# Patient Record
Sex: FEMALE | Race: WHITE | NOT HISPANIC OR LATINO | Employment: OTHER | URBAN - METROPOLITAN AREA
[De-identification: names, ages, dates, MRNs, and addresses within clinical notes are randomized per-mention and may not be internally consistent; named-entity substitution may affect disease eponyms.]

---

## 2020-11-12 ENCOUNTER — TRANSCRIBE ORDERS (OUTPATIENT)
Dept: ADMINISTRATIVE | Facility: HOSPITAL | Age: 80
End: 2020-11-12

## 2020-11-12 DIAGNOSIS — R10.9 STOMACH ACHE: Primary | ICD-10-CM

## 2020-11-18 ENCOUNTER — HOSPITAL ENCOUNTER (OUTPATIENT)
Dept: CT IMAGING | Facility: HOSPITAL | Age: 80
Discharge: HOME | End: 2020-11-18
Admitting: FAMILY MEDICINE

## 2020-11-18 DIAGNOSIS — R10.9 STOMACH ACHE: ICD-10-CM

## 2020-11-18 LAB — CREAT BLDA-MCNC: 0.7 MG/DL (ref 0.6–1.3)

## 2020-11-18 PROCEDURE — 74177 CT ABD & PELVIS W/CONTRAST: CPT

## 2020-11-18 PROCEDURE — 25010000002 IOPAMIDOL 61 % SOLUTION: Performed by: FAMILY MEDICINE

## 2020-11-18 PROCEDURE — 82565 ASSAY OF CREATININE: CPT

## 2020-11-18 RX ADMIN — IOPAMIDOL 100 ML: 612 INJECTION, SOLUTION INTRAVENOUS at 11:45

## 2021-01-29 ENCOUNTER — APPOINTMENT (OUTPATIENT)
Dept: GENERAL RADIOLOGY | Facility: HOSPITAL | Age: 81
End: 2021-01-29

## 2021-01-29 ENCOUNTER — HOSPITAL ENCOUNTER (EMERGENCY)
Facility: HOSPITAL | Age: 81
Discharge: HOME | End: 2021-01-29
Attending: EMERGENCY MEDICINE | Admitting: EMERGENCY MEDICINE

## 2021-01-29 VITALS
HEART RATE: 56 BPM | BODY MASS INDEX: 18.28 KG/M2 | OXYGEN SATURATION: 100 % | TEMPERATURE: 98 F | WEIGHT: 135 LBS | SYSTOLIC BLOOD PRESSURE: 149 MMHG | HEIGHT: 72 IN | DIASTOLIC BLOOD PRESSURE: 74 MMHG | RESPIRATION RATE: 18 BRPM
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
## 2021-01-29 DIAGNOSIS — S52.502A TRAUMATIC CLOSED DISPLACED FRACTURE OF DISTAL END OF LEFT RADIUS AND ULNA, INITIAL ENCOUNTER: Primary | ICD-10-CM

## 2021-01-29 DIAGNOSIS — S52.602A TRAUMATIC CLOSED DISPLACED FRACTURE OF DISTAL END OF LEFT RADIUS AND ULNA, INITIAL ENCOUNTER: Primary | ICD-10-CM

## 2021-01-29 PROCEDURE — 25010000003 LIDOCAINE 1 % SOLUTION: Performed by: EMERGENCY MEDICINE

## 2021-01-29 PROCEDURE — 73110 X-RAY EXAM OF WRIST: CPT

## 2021-01-29 PROCEDURE — 99284 EMERGENCY DEPT VISIT MOD MDM: CPT

## 2021-01-29 PROCEDURE — 96374 THER/PROPH/DIAG INJ IV PUSH: CPT

## 2021-01-29 PROCEDURE — 25010000002 ONDANSETRON PER 1 MG: Performed by: EMERGENCY MEDICINE

## 2021-01-29 PROCEDURE — 25010000002 MORPHINE PER 10 MG: Performed by: EMERGENCY MEDICINE

## 2021-01-29 PROCEDURE — 25010000002 PROPOFOL 10 MG/ML EMULSION: Performed by: EMERGENCY MEDICINE

## 2021-01-29 RX ORDER — ONDANSETRON 2 MG/ML
4 INJECTION INTRAMUSCULAR; INTRAVENOUS ONCE
Status: COMPLETED | OUTPATIENT
Start: 2021-01-29 | End: 2021-01-29

## 2021-01-29 RX ORDER — MORPHINE SULFATE 4 MG/ML
4 INJECTION, SOLUTION INTRAMUSCULAR; INTRAVENOUS ONCE
Status: COMPLETED | OUTPATIENT
Start: 2021-01-29 | End: 2021-01-29

## 2021-01-29 RX ORDER — LIDOCAINE HYDROCHLORIDE 10 MG/ML
10 INJECTION, SOLUTION INFILTRATION; PERINEURAL ONCE
Status: COMPLETED | OUTPATIENT
Start: 2021-01-29 | End: 2021-01-29

## 2021-01-29 RX ORDER — PROPOFOL 10 MG/ML
60 VIAL (ML) INTRAVENOUS ONCE
Status: COMPLETED | OUTPATIENT
Start: 2021-01-29 | End: 2021-01-29

## 2021-01-29 RX ORDER — ACETAMINOPHEN AND CODEINE PHOSPHATE 300; 30 MG/1; MG/1
1 TABLET ORAL EVERY 6 HOURS PRN
Qty: 12 TABLET | Refills: 0 | Status: SHIPPED | OUTPATIENT
Start: 2021-01-29

## 2021-01-29 RX ADMIN — LIDOCAINE HYDROCHLORIDE 10 ML: 10 INJECTION, SOLUTION INFILTRATION; PERINEURAL at 20:36

## 2021-01-29 RX ADMIN — MORPHINE SULFATE 4 MG: 4 INJECTION, SOLUTION INTRAMUSCULAR; INTRAVENOUS at 20:08

## 2021-01-29 RX ADMIN — ONDANSETRON 4 MG: 2 INJECTION INTRAMUSCULAR; INTRAVENOUS at 20:40

## 2021-01-29 RX ADMIN — PROPOFOL 60 MG: 10 INJECTION, EMULSION INTRAVENOUS at 20:36

## 2021-02-01 ENCOUNTER — OFFICE VISIT (OUTPATIENT)
Dept: ORTHOPEDIC SURGERY | Facility: CLINIC | Age: 81
End: 2021-02-01

## 2021-02-01 VITALS — HEIGHT: 64 IN | WEIGHT: 135 LBS | BODY MASS INDEX: 23.05 KG/M2 | RESPIRATION RATE: 18 BRPM

## 2021-02-01 DIAGNOSIS — S52.592A OTHER CLOSED FRACTURE OF DISTAL END OF LEFT RADIUS, INITIAL ENCOUNTER: Primary | ICD-10-CM

## 2021-02-01 PROCEDURE — 29075 APPL CST ELBW FNGR SHORT ARM: CPT | Performed by: ORTHOPAEDIC SURGERY

## 2021-02-01 PROCEDURE — 99204 OFFICE O/P NEW MOD 45 MIN: CPT | Performed by: ORTHOPAEDIC SURGERY

## 2021-02-01 RX ORDER — OMEPRAZOLE 20 MG/1
20 CAPSULE, DELAYED RELEASE ORAL DAILY
COMMUNITY
End: 2021-02-19

## 2021-02-01 RX ORDER — METOPROLOL SUCCINATE 50 MG/1
50 TABLET, EXTENDED RELEASE ORAL DAILY
COMMUNITY

## 2021-02-01 RX ORDER — MEMANTINE HYDROCHLORIDE 10 MG/1
10 TABLET ORAL 2 TIMES DAILY
COMMUNITY

## 2021-02-01 RX ORDER — ATORVASTATIN CALCIUM 40 MG/1
40 TABLET, FILM COATED ORAL DAILY
COMMUNITY

## 2021-02-02 ENCOUNTER — OFFICE VISIT (OUTPATIENT)
Dept: ORTHOPEDIC SURGERY | Facility: CLINIC | Age: 81
End: 2021-02-02

## 2021-02-02 DIAGNOSIS — Z46.89 ENCOUNTER FOR CAST CHECK: Primary | ICD-10-CM

## 2021-02-02 NOTE — PROGRESS NOTES
Patient came in with her  for a cast check status post left wrist fracture cast placement on 2/1/2021.  The  states the swelling has decreased some.  He has tried to keep her wrist elevated as much as possible    The [facility] also examined the patient with me and confirms the swelling has subsided.  Patient is neurovascularly intact no signs of compartment syndrome.  I instructed the patient and her  to keep the arm elevated preferably on pillows as much as possible apply ice packs 20 minutes on 2 hours off multiple times throughout the day.    Instructed patient's  on how to check for a cap refill if any concerns please notify the office immediately or return to the nearest emergency room if our office is closed

## 2021-02-17 NOTE — PROGRESS NOTES
Subjective   Patient ID: Bettie Hagan is a 80 y.o. right hand dominant female referred by [facility] ER and is being seen for orthopaedic evaluation today for a left wrist fracture. Pain and Injury of the Left Wrist (ER follow up visit s/p fall on 1/29/21. Imaging performed, diagnosed with wrist fracture, placed in splint and sling )     Pain and Injury of the Left Wrist (ER follow up visit s/p fall on 1/29/21. Imaging performed, diagnosed with wrist fracture, placed in splint and sling )        CHIEF COMPLAINT:    Left wrist injury and fracture sustained in a mechanical fall.    History of Present Illness    Acute Condition or Injury:    Date of Injury: 1-  Exact Location of injury: Home  Mechanism of injury: Fall to the ground and Fall on the outstretched hand  Work related: []   Yes    [x]   No  Motor vehicle accident: []  Yes     [x]   No     75 year old woman with Alzheimer Disease presents with her  today and referred by the [facility] ER for orthopaedic evaluation and management for her left wrist fracture.   states patient fell out of bed a few days ago and landing on her outstretched left hand. No open wound or fracture.  No reported head trauma or loss of consciousness.  She was brought to [facility] ER where exam and imaging showed a left distal radius fracture, displaced with comminution and mild shortening. She was treated with closed reduction, padded volar splint and RICE therapy.      She has been picking at her wrapping gauze around her splint and presents to clinic today with rope like tight strands of gauze at the mid forearm and the hand free of the wrap and creating a tourniquet like effect with soft balloon swelling of the wrist, dorsum of the hand and fingers.  I promptly removed the wrap, and treated the arm and hand with elevation and ice for 30 - 45 minutes and there was visible good improvement.  No signs of compartment syndrome, and the swelling gradually subsided, improved mobility of the  "digits, intact sensation, 2+ radial pulse and brisk cap refill to all fingers and thumb.     Past Medical History:   Diagnosis Date   • Alzheimer disease (CMS/HCC)         Past Surgical History:   Procedure Laterality Date   • HYSTERECTOMY         No family history on file.    Social History     Socioeconomic History   • Marital status:      Spouse name: Not on file   • Number of children: Not on file   • Years of education: Not on file   • Highest education level: Not on file   Occupational History   • Occupation: retired respiratory therapist     Employer: [employer]   Tobacco Use   • Smoking status: Never Smoker   • Smokeless tobacco: Never Used   Substance and Sexual Activity   • Alcohol use: Never     Frequency: Never   • Drug use: Never   Social History Narrative    Right hand dominant       No Known Allergies    Review of Systems   Constitutional: Negative for fever.   Musculoskeletal: Positive for arthralgias and joint swelling.   Skin: Positive for color change (ecchymosis diffusely of foreram and dorsal hand.). Negative for rash and wound.   Neurological: Positive for weakness.     I have reviewed the medical and surgical history, family history, social history, medications, and/or allergies, and the review of systems of this report.    Objective   Resp 18   Ht 162.6 cm (64\")   Wt 61.2 kg (135 lb)   BMI 23.17 kg/m²   Physical Exam  Vitals signs reviewed.   Constitutional:       General: She is not in acute distress.     Appearance: She is well-developed.   Skin:     General: Skin is warm and dry.      Findings: No erythema or rash.   Neurological:      Mental Status: She is alert.       Left Hand Exam     Tenderness   Left hand tenderness location: diffuse and with prominent soft distal forearm and hand swelling on arrival that responded well to ice and elevation.     Muscle Strength   Wrist extension: 4/5   Wrist flexion: 4/5   :  4/5     Other   Erythema: " absent  Pulse: present            Neurologic Exam    Assessment/Plan     Independent Review of Radiographic Studies:    Reviewed relevant prior imaging with patient again today.  Reviewed images and agree with radiologist interpretation.     Laboratory and Other Studies:  No new results reviewed today.     Medical Decision Making:    Stable initial neurovascular and fracture exam.  Closed treatment of fracture and or dislocation.  Medications as prescribed and only as tolerated.  Physical and occupational therapy planned.  Activity restrictions as appropriate.    Procedures     Diagnoses and all orders for this visit:    1. Other closed fracture of distal end of left radius, initial encounter (Primary)       Discussion of orthopaedic goals and activities and patient's  expressed appreciation.  Risk, benefits, and merits of treatment alternatives reviewed with the patient and her 's and questions answered  Regular exercise as tolerated  Guided on proper techniques for mobility, strength, agility and/or conditioning exercises  Ice and elevaton modalities as beneficial and instructions given to .  He will monitor for intact mobility of fingers and thumb, intact sensation and good capillary refill of fingertips.  He understands to call or go to ER for any worsening or concerns.  Weight bearing parameters reviewed  Cast, splint or brace care and assistive device usage instructions given  Take prescribed medications as instructed only as tolerated    Recommendations/Plan:  Exercise, medications, injections, other patient advice, and return appointment as noted.  Splint/Cast: Adult (>11 years old) padded fiberglass short arm cast  Referral: No referrals made at today's visit.  Test/Studies: No additional studies ordered at this time.  Surgery: Plan non-surgical treatment for current orthopaedic condition.  Work/Activity Status: Usual activities, no strenuous activity. No use of involved  extremity.    Return in about 1 day (around 2/2/2021) for Recheck, cast check.  Patient and .are encouraged and agreeable to call or return sooner for any issues or concerns.

## 2021-02-19 ENCOUNTER — OFFICE VISIT (OUTPATIENT)
Dept: ORTHOPEDIC SURGERY | Facility: CLINIC | Age: 81
End: 2021-02-19

## 2021-02-19 VITALS — TEMPERATURE: 97.1 F | HEIGHT: 64 IN | RESPIRATION RATE: 18 BRPM | BODY MASS INDEX: 23.05 KG/M2 | WEIGHT: 135 LBS

## 2021-02-19 DIAGNOSIS — S52.592P OTHER CLOSED FRACTURE OF DISTAL END OF LEFT RADIUS WITH MALUNION, SUBSEQUENT ENCOUNTER: Primary | ICD-10-CM

## 2021-02-19 PROCEDURE — 99213 OFFICE O/P EST LOW 20 MIN: CPT | Performed by: PHYSICIAN ASSISTANT

## 2021-02-19 PROCEDURE — 29075 APPL CST ELBW FNGR SHORT ARM: CPT | Performed by: PHYSICIAN ASSISTANT

## 2021-02-19 RX ORDER — OMEPRAZOLE 20 MG/1
TABLET, DELAYED RELEASE ORAL
COMMUNITY
Start: 2021-01-20

## 2021-02-19 NOTE — PROGRESS NOTES
Subjective   Patient ID: Bettie Hagan is a 80 y.o. right hand dominant female  Follow-up of the Left Wrist (closed fracture of distal end of left radius DOI:1/29/21)         History of Present Illness    Patient is following up with her  for scheduled follow-up visit regarding left wrist distal radius fracture.  Date of injury 1/29/2021.  Patient has been immobilized in a flexion molded fiberglass short arm cast since her initial office visit.  Her  states she has been picking at the cast and pulling the cotton.  The  also informs me that she has been experiencing pain to the wrist.    Past Medical History:   Diagnosis Date   • Alzheimer disease (CMS/HCC)         Past Surgical History:   Procedure Laterality Date   • HYSTERECTOMY         History reviewed. No pertinent family history.    Social History     Socioeconomic History   • Marital status:      Spouse name: Not on file   • Number of children: Not on file   • Years of education: Not on file   • Highest education level: Not on file   Occupational History   • Occupation: retired respiratory therapist     Employer: [employer]   Tobacco Use   • Smoking status: Never Smoker   • Smokeless tobacco: Never Used   Substance and Sexual Activity   • Alcohol use: Never     Frequency: Never   • Drug use: Never   Social History Narrative    Right hand dominant         Current Outpatient Medications:   •  acetaminophen-codeine (TYLENOL #3) 300-30 MG per tablet, Take 1 tablet by mouth Every 6 (Six) Hours As Needed for Moderate Pain ., Disp: 12 tablet, Rfl: 0  •  atorvastatin (LIPITOR) 40 MG tablet, Take 40 mg by mouth Daily., Disp: , Rfl:   •  GNP Omeprazole 20 MG tablet delayed-release, , Disp: , Rfl:   •  memantine (NAMENDA) 10 MG tablet, Take 10 mg by mouth 2 (Two) Times a Day., Disp: , Rfl:   •  metoprolol succinate XL (TOPROL-XL) 50 MG 24 hr tablet, Take 50 mg by mouth Daily., Disp: , Rfl:     No Known  "Allergies    Review of Systems   Constitutional: Negative for diaphoresis, fever and unexpected weight change.   HENT: Negative for dental problem and sore throat.    Eyes: Negative for visual disturbance.   Respiratory: Negative for shortness of breath.    Cardiovascular: Negative for chest pain.   Gastrointestinal: Negative for abdominal pain, constipation, diarrhea, nausea and vomiting.   Genitourinary: Negative for difficulty urinating and frequency.   Musculoskeletal: Positive for arthralgias (left wrist).   Neurological: Negative for headaches.   Hematological: Does not bruise/bleed easily.       I have reviewed the medical and surgical history, family history, social history, medications, and/or allergies, and the review of systems of this report.    Objective   Temp 97.1 °F (36.2 °C) (Skin)   Resp 18   Ht 162.6 cm (64\")   Wt 61.2 kg (135 lb)   BMI 23.17 kg/m²    Physical Exam  Vitals signs and nursing note reviewed.   Constitutional:       Appearance: Normal appearance.   Pulmonary:      Effort: Pulmonary effort is normal.   Musculoskeletal:      Left wrist: She exhibits decreased range of motion, tenderness, bony tenderness and deformity.      Left hand: She exhibits no tenderness and normal capillary refill. Normal sensation noted.   Neurological:      Mental Status: She is alert and oriented to person, place, and time.   Psychiatric:         Behavior: Behavior normal.       Ortho Exam      Neurologic Exam     Mental Status   Oriented to person, place, and time.              Assessment/Plan   Independent Review of Radiographic Studies:    X-ray of the left wrist 3 view performed in the office for the evaluation of distal radius fracture healing.  Comparison films are available and reviewed.  There has been interval dorsal angulation although an acceptable position.  We discussed with the patient and her  that should she develop pain and/or carpal tunnel symptoms in the near future we could " "perform carpal tunnel release and/or osteolysis.      Short arm cast    Date/Time: 2/19/2021 11:04 AM  Performed by: Rito Hawk PA-C  Authorized by: Rito Hawk PA-C   Consent: Verbal consent obtained.  Risks and benefits: risks, benefits and alternatives were discussed  Consent given by: patient  Patient understanding: patient states understanding of the procedure being performed  Patient consent: the patient's understanding of the procedure matches consent given  Procedure consent: procedure consent matches procedure scheduled  Relevant documents: relevant documents present and verified  Test results: test results available and properly labeled  Site marked: the operative site was marked  Imaging studies: imaging studies available  Patient identity confirmed: verbally with patient  Time out: Immediately prior to procedure a \"time out\" was called to verify the correct patient, procedure, equipment, support staff and site/side marked as required.  Location details: left wrist  Splint type: volar short arm  Supplies used: cotton padding  Post-procedure: The splinted body part was neurovascularly unchanged following the procedure.  Patient tolerance: Patient tolerated the procedure well with no immediate complications             Diagnoses and all orders for this visit:    1. Other closed fracture of distal end of left radius with malunion, subsequent encounter (Primary)  -     XR Wrist 3+ View Left; Future  -     short arm cast       Reduced physical activity as appropriate  Weight bearing parameters reviewed    Recommendations/Plan:  Patient and guardian(s) are encouraged to call or return for any issues or concerns.  Both myself and Dr. Ley explained the malunion and possible treatment options in the future should she encounter problems in the future.  The patient and her  are content with continuing the nonsurgical management with casting.  Follow-up in 3 weeks cast off x-ray on " arrival  Patient agreeable to call or return sooner for any concerns.    A new fiberglass short arm cast with flexion molding was applied.  Patient is neurovascularly intact pre and post placement.             EMR Dragon-transcription disclaimer:  This encounter note is an electronic transcription of spoken language to printed text.  Electronic transcription of spoken language may permit erroneous or at times nonsensical words or phrases to be inadvertently transcribed.  Although I have reviewed the note for such errors, some may still exist

## 2021-03-04 ENCOUNTER — OFFICE VISIT (OUTPATIENT)
Dept: ORTHOPEDIC SURGERY | Facility: CLINIC | Age: 81
End: 2021-03-04

## 2021-03-04 VITALS
BODY MASS INDEX: 21.44 KG/M2 | HEIGHT: 64 IN | DIASTOLIC BLOOD PRESSURE: 70 MMHG | TEMPERATURE: 97.1 F | WEIGHT: 125.6 LBS | SYSTOLIC BLOOD PRESSURE: 112 MMHG

## 2021-03-04 DIAGNOSIS — S52.592P OTHER CLOSED FRACTURE OF DISTAL END OF LEFT RADIUS WITH MALUNION, SUBSEQUENT ENCOUNTER: Primary | ICD-10-CM

## 2021-03-04 PROCEDURE — 99212 OFFICE O/P EST SF 10 MIN: CPT | Performed by: PHYSICIAN ASSISTANT

## 2021-03-04 NOTE — PROGRESS NOTES
Subjective   Patient ID: Bettie Hagan is a 80 y.o. right hand dominant female  Follow-up and Fracture of the Left Wrist (Follow up closed fracture of distal end of left radius with malunion, DOI: 1/29/21)         History of Present Illness    Patient is following up for scheduled visit with her  for a distal left radius fracture with recent diagnosis of malunion.  Patient states she is having no pain.  She denies numbness or tingling.    Past Medical History:   Diagnosis Date   • Alzheimer disease (CMS/HCC)         Past Surgical History:   Procedure Laterality Date   • HYSTERECTOMY         No family history on file.    Social History     Socioeconomic History   • Marital status:      Spouse name: Not on file   • Number of children: Not on file   • Years of education: Not on file   • Highest education level: Not on file   Occupational History   • Occupation: retired respiratory therapist     Employer: [employer]   Tobacco Use   • Smoking status: Never Smoker   • Smokeless tobacco: Never Used   Substance and Sexual Activity   • Alcohol use: Never     Frequency: Never   • Drug use: Never   Social History Narrative    Right hand dominant         Current Outpatient Medications:   •  acetaminophen-codeine (TYLENOL #3) 300-30 MG per tablet, Take 1 tablet by mouth Every 6 (Six) Hours As Needed for Moderate Pain ., Disp: 12 tablet, Rfl: 0  •  atorvastatin (LIPITOR) 40 MG tablet, Take 40 mg by mouth Daily., Disp: , Rfl:   •  GNP Omeprazole 20 MG tablet delayed-release, , Disp: , Rfl:   •  memantine (NAMENDA) 10 MG tablet, Take 10 mg by mouth 2 (Two) Times a Day., Disp: , Rfl:   •  metoprolol succinate XL (TOPROL-XL) 50 MG 24 hr tablet, Take 50 mg by mouth Daily., Disp: , Rfl:     No Known Allergies    Review of Systems   Constitutional: Negative for fever.   HENT: Negative for dental problem and voice change.    Eyes: Negative for visual disturbance.   Respiratory: Negative for  "shortness of breath.    Cardiovascular: Negative for chest pain.   Gastrointestinal: Negative for abdominal pain.   Genitourinary: Negative for dysuria.   Musculoskeletal: Positive for arthralgias. Negative for gait problem and joint swelling.   Skin: Negative for rash.   Neurological: Negative for speech difficulty.   Hematological: Does not bruise/bleed easily.   Psychiatric/Behavioral: Negative for confusion.       I have reviewed the medical and surgical history, family history, social history, medications, and/or allergies, and the review of systems of this report.    Objective   /70 (BP Location: Right arm)   Temp 97.1 °F (36.2 °C)   Ht 162.6 cm (64\")   Wt 57 kg (125 lb 9.6 oz)   BMI 21.56 kg/m²    Physical Exam  Vitals signs and nursing note reviewed.   Constitutional:       Appearance: Normal appearance.   Pulmonary:      Effort: Pulmonary effort is normal.   Musculoskeletal:      Left wrist: She exhibits decreased range of motion and deformity (slight radial deviation).      Left hand: She exhibits swelling. She exhibits no bony tenderness, normal capillary refill and no laceration. Normal sensation noted.   Neurological:      Mental Status: She is alert and oriented to person, place, and time.   Psychiatric:         Behavior: Behavior normal.       Ortho Exam   Extremity DVT signs are negative by clinical screen.   Neurologic Exam     Mental Status   Oriented to person, place, and time.              Assessment/Plan   Independent Review of Radiographic Studies:    X-ray of the left wrist 3 view performed in the office for the evaluation of distal radius fracture healing.  Comparison films are available and reviewed.  Since the most recent x-ray imaging there has been no interval displacement however the fracture has suffered from dorsal angulation when compared to initial x-ray imaging.  There does appear to be improved callus formation with periostitis    Procedures       Diagnoses and all orders " for this visit:    1. Other closed fracture of distal end of left radius with malunion, subsequent encounter (Primary)  -     XR Wrist 3+ View Left; Future       Discussion of orthopedic goals  Risk, benefits, and merits of treatment alternatives reviewed with the patient and questions answered  Reduced physical activity as appropriate  Weight bearing parameters reviewed  Avoid offending activity  Ice, heat, and/or modalities as beneficial    Recommendations/Plan:  Patient and guardian(s) are encouraged to call or return for any issues or concerns.  Her  would prefer a fiberglass cast so she could not pick or remove the cast in lieu of a splint    Patient was placed in a new fiberglass short arm cast.   Follow-up in 2 and half weeks x-ray on arrival    Patient agreeable to call or return sooner for any concerns.    Again, I did have a discussion with the patient and her  the nature of the malunion and the possible treatment options in the future should she develop any residual pain, numbness or tingling.  The patient would like to continue the nonsurgical treatment with immobilization           EMR Dragon-transcription disclaimer:  This encounter note is an electronic transcription of spoken language to printed text.  Electronic transcription of spoken language may permit erroneous or at times nonsensical words or phrases to be inadvertently transcribed.  Although I have reviewed the note for such errors, some may still exist

## 2021-03-23 ENCOUNTER — OFFICE VISIT (OUTPATIENT)
Dept: ORTHOPEDIC SURGERY | Facility: CLINIC | Age: 81
End: 2021-03-23

## 2021-03-23 VITALS — TEMPERATURE: 96.8 F | BODY MASS INDEX: 21.34 KG/M2 | WEIGHT: 125 LBS | HEIGHT: 64 IN | RESPIRATION RATE: 16 BRPM

## 2021-03-23 DIAGNOSIS — S52.592P OTHER CLOSED FRACTURE OF DISTAL END OF LEFT RADIUS WITH MALUNION, SUBSEQUENT ENCOUNTER: Primary | ICD-10-CM

## 2021-03-23 PROCEDURE — 99212 OFFICE O/P EST SF 10 MIN: CPT | Performed by: PHYSICIAN ASSISTANT

## 2021-03-23 NOTE — PROGRESS NOTES
Subjective   Patient ID: Bettie Hagan is a 80 y.o. right hand dominant female  Follow-up of the Left Wrist (Follow up on closed fracture distal end of left radius. DOI: 1/29/21. Reports no pain at this time. )         History of Present Illness  Patient reports she is doing well.   Denies numbness or tingling.                                                    Past Medical History:   Diagnosis Date   • Alzheimer disease (CMS/HCC)         Past Surgical History:   Procedure Laterality Date   • HYSTERECTOMY         No family history on file.    Social History     Socioeconomic History   • Marital status:      Spouse name: Not on file   • Number of children: Not on file   • Years of education: Not on file   • Highest education level: Not on file   Tobacco Use   • Smoking status: Never Smoker   • Smokeless tobacco: Never Used   Substance and Sexual Activity   • Alcohol use: Never   • Drug use: Never         Current Outpatient Medications:   •  acetaminophen-codeine (TYLENOL #3) 300-30 MG per tablet, Take 1 tablet by mouth Every 6 (Six) Hours As Needed for Moderate Pain ., Disp: 12 tablet, Rfl: 0  •  atorvastatin (LIPITOR) 40 MG tablet, Take 40 mg by mouth Daily., Disp: , Rfl:   •  GNP Omeprazole 20 MG tablet delayed-release, , Disp: , Rfl:   •  memantine (NAMENDA) 10 MG tablet, Take 10 mg by mouth 2 (Two) Times a Day., Disp: , Rfl:   •  metoprolol succinate XL (TOPROL-XL) 50 MG 24 hr tablet, Take 50 mg by mouth Daily., Disp: , Rfl:     No Known Allergies    Review of Systems   Constitutional: Negative for diaphoresis, fever and unexpected weight change.   HENT: Negative for dental problem and sore throat.    Eyes: Negative for visual disturbance.   Respiratory: Negative for shortness of breath.    Cardiovascular: Negative for chest pain.   Gastrointestinal: Negative for abdominal pain, constipation, diarrhea, nausea and vomiting.   Genitourinary: Negative for difficulty urinating and frequency.   Musculoskeletal:  "Positive for arthralgias (left wrist).   Neurological: Negative for headaches.   Hematological: Does not bruise/bleed easily.       I have reviewed the medical and surgical history, family history, social history, medications, and/or allergies, and the review of systems of this report.    Objective   Temp 96.8 °F (36 °C)   Resp 16   Ht 162.6 cm (64\")   Wt 56.7 kg (125 lb)   BMI 21.46 kg/m²    Physical Exam  Vitals and nursing note reviewed.   Constitutional:       Appearance: Normal appearance.   Pulmonary:      Effort: Pulmonary effort is normal.   Musculoskeletal:      Right wrist: No effusion, lacerations, bony tenderness, snuff box tenderness or crepitus. Normal range of motion. Normal pulse.   Neurological:      Mental Status: She is alert and oriented to person, place, and time.   Psychiatric:         Behavior: Behavior normal.       Ortho Exam     Neurologic Exam     Mental Status   Oriented to person, place, and time.        No anatomical snuffbox ttp        Assessment/Plan   Independent Review of Radiographic Studies:    X-ray of the left wrist 3 view performed in the office for the evaluation of distal radius fracture healing.  Comparison films are available and reviewed.  Since the most recent x-ray imaging there has been no interval displacement however the fracture has suffered from dorsal angulation when compared to initial x-ray imaging.  There does appear to be improved callus formation with periostitis    Procedures       Diagnoses and all orders for this visit:    1. Other closed fracture of distal end of left radius with malunion, subsequent encounter (Primary)  -     XR Wrist 3+ View Left; Future  -     Ambulatory Referral to Physical Therapy Evaluate and treat, Ortho; Heat; ROM, Strengthening       Orthopaedic activities reviewed and patient and guardian(s) expressed appreciation  Discussion of orthopedic goals  Physical therapy referral given  Use brace as instructed  Weight bearing " parameters reviewed  Avoid offending activity    Recommendations/Plan:  Exercise, medications, injections, other patient advice, and return appointment as noted.  Patient is encouraged to call or return for any issues or concerns.    Patient was given a velcro wrist brace to use for the next two weeks.    Patient agreeable to call or return sooner for any concerns.  Again, I did have a discussion with the patient and her  the nature of the malunion and the possible treatment options in the future should she develop any residual pain, numbness or tingling.  The patient would like to continue the nonsurgical treatment with immobilization             EMR Dragon-transcription disclaimer:  This encounter note is an electronic transcription of spoken language to printed text.  Electronic transcription of spoken language may permit erroneous or at times nonsensical words or phrases to be inadvertently transcribed.  Although I have reviewed the note for such errors, some may still exist

## 2021-04-12 ENCOUNTER — TREATMENT (OUTPATIENT)
Dept: PHYSICAL THERAPY | Facility: CLINIC | Age: 81
End: 2021-04-12

## 2021-04-12 DIAGNOSIS — S52.592P: Primary | ICD-10-CM

## 2021-04-12 PROCEDURE — 97530 THERAPEUTIC ACTIVITIES: CPT | Performed by: PHYSICAL THERAPIST

## 2021-04-12 PROCEDURE — 97110 THERAPEUTIC EXERCISES: CPT | Performed by: PHYSICAL THERAPIST

## 2021-04-12 PROCEDURE — 97161 PT EVAL LOW COMPLEX 20 MIN: CPT | Performed by: PHYSICAL THERAPIST

## 2021-04-12 NOTE — PROGRESS NOTES
Physical Therapy Initial Evaluation and Plan of Care      Patient: Bettie Hagan   : 1940  Diagnosis/ICD-10 Code:  No primary diagnosis found.  Referring practitioner: MARY Locke*    Subjective Evaluation    History of Present Illness  Mechanism of injury: PMH: pt with advanced dementia so history is via spouse. Dementia is playing a factor in the malunion because pt is not wearing brace as instructed.  2021 rolled out of the bed and broke wrist on the L UE. Spouse informed us about how pt is having difficulty wearing brace as instructed.     Spouse has concerns about PT due to other medical issues and physician visits. Spouse informs us that pt does not have any discomfort or great limitations due to this injury but she is very limited with function due to her dementia.        Patient Occupation: retired  Pain  Current pain ratin             Objective          Observations     Additional Wrist/Hand Observation Details  Pt arriving to PT wearing a small wrist brace.  Pt is not having any noted distress at rest.  Pt does seem to have some atrophy and some swelling in the wrist wear the fx is located.     Pt required  for all communication.  She was very limited in her ability to understand what was happening to her.      Active Range of Motion     Left Wrist   Wrist flexion: 54 degrees   Wrist extension: Left wrist active extension: restricted.     Additional Active Range of Motion Details  L Supination: 55  L pronation 55    Passive Range of Motion     Additional Passive Range of Motion Details  Pt with limited PROM but she did not have pain with end ranges upon testing and she was not fearful of the end ranges.     Strength/Myotome Testing     Left Wrist/Hand      (2nd hand position)     Trial 1: 15 lbs    Trial 2: 15 lbs    Trial 3: 12 lbs    Average: 14 lbs    Right Wrist/Hand      (2nd hand position)     Trial 1: 35 lbs    Trial 2: 35 lbs          Assessment & Plan      Assessment  Impairments: abnormal or restricted ROM, impaired physical strength and lacks appropriate home exercise program  Assessment details: Patient is a 81 year old female who comes to physical therapy with L wrist fracture.  Her secondary issue of dementia has limited her healing due to limited brace use and decreased understanding of precautions.  She is currently functioning at her normal level but has wrist weakness and some tightness.      She was given HEP for her and her  to do at home.   will call if any issues arise.   Prognosis: fair  Prognosis details:   SHORT TERM GOALS:   3 weeks    1.  Pt and  will be independent with HEP.          Plan  Therapy options: will be seen for skilled physical therapy services  Planned modality interventions: cryotherapy and contrast bath immersion  Planned therapy interventions: strengthening, stretching, therapeutic activities, manual therapy, flexibility, functional ROM exercises, home exercise program, joint mobilization, soft tissue mobilization and orthotic fitting/training  Frequency: 3x week  Duration in weeks: 4  Treatment plan discussed with: patient and caregiver  Plan details:  will call if further needs.         Manual Therapy:         mins  97136;  Therapeutic Exercise:     16    mins  74659;     Neuromuscular Armando:        mins  53338;    Therapeutic Activity:     10     mins  98634;     Gait Training:           mins  41004;     Ultrasound:          mins  64009;    Electrical Stimulation:         mins  51542 ( );  Dry Needling          mins self-pay    Timed Treatment:   26   mins   Total Treatment:     48   mins    PT SIGNATURE: Kenneth Collins, PT   DATE TREATMENT INITIATED: 4/12/2021    Medicare Initial Certification  Certification Period: 7/11/2021  I certify that the therapy services are furnished while this patient is under my care.  The services outlined above are required by this patient, and will be  reviewed every 90 days.     PHYSICIAN: Rito Hawk PA-C      DATE:     Please sign and return via fax to  .. Thank you, [facility].

## 2021-05-26 ENCOUNTER — OFFICE VISIT (OUTPATIENT)
Dept: ORTHOPEDIC SURGERY | Facility: CLINIC | Age: 81
End: 2021-05-26

## 2021-05-26 VITALS — HEIGHT: 64 IN | WEIGHT: 125 LBS | BODY MASS INDEX: 21.34 KG/M2 | TEMPERATURE: 97.1 F

## 2021-05-26 DIAGNOSIS — S52.592P OTHER CLOSED FRACTURE OF DISTAL END OF LEFT RADIUS WITH MALUNION, SUBSEQUENT ENCOUNTER: Primary | ICD-10-CM

## 2021-05-26 PROCEDURE — 99212 OFFICE O/P EST SF 10 MIN: CPT | Performed by: PHYSICIAN ASSISTANT

## 2021-05-26 RX ORDER — QUETIAPINE FUMARATE 25 MG/1
25 TABLET, FILM COATED ORAL NIGHTLY
COMMUNITY

## 2021-05-26 NOTE — PROGRESS NOTES
Subjective   Patient ID: Bettie Hagan is a 81 y.o. right hand dominant female  Follow-up of the Left Wrist (Recheck of radius fracture.  DOI 1/29/21.)         History of Present Illness  Patient is following up for scheduled appointment regarding left wrist distal radius fracture.  Date of injury 1/29/2021.  Patient was immobilized for approximately 8 weeks.  She has done well since the last office visit.  She denies having any pain.  Denies numbness or tingling.                                                 Past Medical History:   Diagnosis Date   • Alzheimer disease (CMS/HCC)         Past Surgical History:   Procedure Laterality Date   • HYSTERECTOMY         History reviewed. No pertinent family history.    Social History     Socioeconomic History   • Marital status:      Spouse name: Not on file   • Number of children: Not on file   • Years of education: Not on file   • Highest education level: Not on file   Tobacco Use   • Smoking status: Never Smoker   • Smokeless tobacco: Never Used   Substance and Sexual Activity   • Alcohol use: Never   • Drug use: Never         Current Outpatient Medications:   •  atorvastatin (LIPITOR) 40 MG tablet, Take 40 mg by mouth Daily., Disp: , Rfl:   •  GNP Omeprazole 20 MG tablet delayed-release, , Disp: , Rfl:   •  memantine (NAMENDA) 10 MG tablet, Take 10 mg by mouth 2 (Two) Times a Day., Disp: , Rfl:   •  metoprolol succinate XL (TOPROL-XL) 50 MG 24 hr tablet, Take 50 mg by mouth Daily., Disp: , Rfl:   •  QUEtiapine (SEROquel) 25 MG tablet, Take 25 mg by mouth Every Night., Disp: , Rfl:   •  acetaminophen-codeine (TYLENOL #3) 300-30 MG per tablet, Take 1 tablet by mouth Every 6 (Six) Hours As Needed for Moderate Pain ., Disp: 12 tablet, Rfl: 0    No Known Allergies    Review of Systems   Constitutional: Negative for diaphoresis, fever and unexpected weight change.   HENT: Negative for dental problem and sore throat.    Eyes: Negative for visual disturbance.  "  Respiratory: Negative for shortness of breath.    Cardiovascular: Negative for chest pain.   Gastrointestinal: Negative for abdominal pain, constipation, diarrhea, nausea and vomiting.   Genitourinary: Negative for difficulty urinating and frequency.   Musculoskeletal: Negative for arthralgias.   Neurological: Negative for numbness and headaches.   Hematological: Does not bruise/bleed easily.   All other systems reviewed and are negative.      I have reviewed the medical and surgical history, family history, social history, medications, and/or allergies, and the review of systems of this report.    Objective   Temp 97.1 °F (36.2 °C)   Ht 162.6 cm (64\")   Wt 56.7 kg (125 lb)   BMI 21.46 kg/m²    Physical Exam  Vitals and nursing note reviewed.   Constitutional:       Appearance: Normal appearance.   Pulmonary:      Effort: Pulmonary effort is normal.   Musculoskeletal:      Left wrist: No swelling, tenderness, bony tenderness, snuff box tenderness or crepitus. Normal range of motion. Normal pulse.      Left hand: No swelling, lacerations, tenderness or bony tenderness. Normal range of motion. Normal sensation. There is no disruption of two-point discrimination. Normal capillary refill. Normal pulse.   Neurological:      Mental Status: She is alert and oriented to person, place, and time.       Ortho Exam     Neurologic Exam     Mental Status   Oriented to person, place, and time.              Assessment/Plan   Independent Review of Radiographic Studies:    AP and lateral of the left wrist, indication to evaluate fracture healing, and compared with prior imaging, shows interm fracture healing, callus and or periostitis in continued good position and alignment.      Procedures       Diagnoses and all orders for this visit:    1. Other closed fracture of distal end of left radius with malunion, subsequent encounter (Primary)  -     XR Wrist 3+ View Left; Future       Orthopedic activities reviewed and patient " expressed appreciation  Discussion of orthopedic goals  Risk, benefits, and merits of treatment alternatives reviewed with the patient and questions answered    Recommendations/Plan:  Patient is encouraged to call or return for any issues or concerns.    Follow up as needed    Patient agreeable to call or return sooner for any concerns.             EMR Dragon-transcription disclaimer:  This encounter note is an electronic transcription of spoken language to printed text.  Electronic transcription of spoken language may permit erroneous or at times nonsensical words or phrases to be inadvertently transcribed.  Although I have reviewed the note for such errors, some may still exist

## 2021-11-04 ENCOUNTER — TRANSCRIBE ORDERS (OUTPATIENT)
Dept: LAB | Facility: HOSPITAL | Age: 81
End: 2021-11-04

## 2021-11-04 DIAGNOSIS — Z01.818 PRE-OPERATIVE CLEARANCE: Primary | ICD-10-CM

## 2021-11-08 ENCOUNTER — LAB (OUTPATIENT)
Dept: LAB | Facility: HOSPITAL | Age: 81
End: 2021-11-08

## 2021-11-08 DIAGNOSIS — Z01.818 PRE-OPERATIVE CLEARANCE: ICD-10-CM

## 2021-11-08 LAB — SARS-COV-2 RNA PNL SPEC NAA+PROBE: NOT DETECTED

## 2021-11-08 PROCEDURE — U0004 COV-19 TEST NON-CDC HGH THRU: HCPCS

## 2021-11-08 PROCEDURE — C9803 HOPD COVID-19 SPEC COLLECT: HCPCS
